# Patient Record
Sex: FEMALE | Race: WHITE | Employment: OTHER | URBAN - METROPOLITAN AREA
[De-identification: names, ages, dates, MRNs, and addresses within clinical notes are randomized per-mention and may not be internally consistent; named-entity substitution may affect disease eponyms.]

---

## 2017-02-06 PROBLEM — Z47.89 ORTHOPEDIC AFTERCARE: Status: ACTIVE | Noted: 2017-02-06

## 2017-04-07 RX ORDER — CEFADROXIL 500 MG/1
CAPSULE ORAL 2 TIMES DAILY
Status: ON HOLD | COMMUNITY
End: 2019-01-01

## 2017-04-18 ENCOUNTER — ANESTHESIA EVENT (OUTPATIENT)
Dept: SURGERY | Facility: HOSPITAL | Age: 82
End: 2017-04-18

## 2017-04-18 ENCOUNTER — SURGERY (OUTPATIENT)
Age: 82
End: 2017-04-18

## 2017-04-18 ENCOUNTER — ANESTHESIA (OUTPATIENT)
Dept: SURGERY | Facility: HOSPITAL | Age: 82
End: 2017-04-18

## 2017-04-18 ENCOUNTER — HOSPITAL ENCOUNTER (OUTPATIENT)
Facility: HOSPITAL | Age: 82
Setting detail: HOSPITAL OUTPATIENT SURGERY
Discharge: HOME | End: 2017-04-18
Attending: ORTHOPAEDIC SURGERY | Admitting: ORTHOPAEDIC SURGERY
Payer: MEDICARE

## 2017-04-18 VITALS
HEART RATE: 68 BPM | TEMPERATURE: 98 F | BODY MASS INDEX: 27 KG/M2 | SYSTOLIC BLOOD PRESSURE: 125 MMHG | OXYGEN SATURATION: 94 % | WEIGHT: 145 LBS | DIASTOLIC BLOOD PRESSURE: 53 MMHG | RESPIRATION RATE: 16 BRPM

## 2017-04-18 PROCEDURE — 0SPF04Z REMOVAL OF INTERNAL FIXATION DEVICE FROM RIGHT ANKLE JOINT, OPEN APPROACH: ICD-10-PCS | Performed by: ORTHOPAEDIC SURGERY

## 2017-04-18 PROCEDURE — 82962 GLUCOSE BLOOD TEST: CPT

## 2017-04-18 RX ORDER — DEXTROSE MONOHYDRATE 25 G/50ML
50 INJECTION, SOLUTION INTRAVENOUS
Status: DISCONTINUED | OUTPATIENT
Start: 2017-04-18 | End: 2017-04-18

## 2017-04-18 RX ORDER — BUPIVACAINE HYDROCHLORIDE 5 MG/ML
INJECTION, SOLUTION EPIDURAL; INTRACAUDAL AS NEEDED
Status: DISCONTINUED | OUTPATIENT
Start: 2017-04-18 | End: 2017-04-18

## 2017-04-18 RX ORDER — ACETAMINOPHEN AND CODEINE PHOSPHATE 300; 30 MG/1; MG/1
1 TABLET ORAL AS NEEDED
Status: DISCONTINUED | OUTPATIENT
Start: 2017-04-18 | End: 2017-04-18

## 2017-04-18 RX ORDER — HYDROMORPHONE HYDROCHLORIDE 1 MG/ML
0.4 INJECTION, SOLUTION INTRAMUSCULAR; INTRAVENOUS; SUBCUTANEOUS EVERY 5 MIN PRN
Status: DISCONTINUED | OUTPATIENT
Start: 2017-04-18 | End: 2017-04-18

## 2017-04-18 RX ORDER — SODIUM CHLORIDE, SODIUM LACTATE, POTASSIUM CHLORIDE, CALCIUM CHLORIDE 600; 310; 30; 20 MG/100ML; MG/100ML; MG/100ML; MG/100ML
INJECTION, SOLUTION INTRAVENOUS CONTINUOUS
Status: DISCONTINUED | OUTPATIENT
Start: 2017-04-18 | End: 2017-04-18

## 2017-04-18 RX ORDER — MIDAZOLAM HYDROCHLORIDE 1 MG/ML
1 INJECTION INTRAMUSCULAR; INTRAVENOUS EVERY 5 MIN PRN
Status: DISCONTINUED | OUTPATIENT
Start: 2017-04-18 | End: 2017-04-18

## 2017-04-18 RX ORDER — ONDANSETRON 2 MG/ML
4 INJECTION INTRAMUSCULAR; INTRAVENOUS AS NEEDED
Status: DISCONTINUED | OUTPATIENT
Start: 2017-04-18 | End: 2017-04-18

## 2017-04-18 RX ORDER — NALOXONE HYDROCHLORIDE 0.4 MG/ML
80 INJECTION, SOLUTION INTRAMUSCULAR; INTRAVENOUS; SUBCUTANEOUS AS NEEDED
Status: DISCONTINUED | OUTPATIENT
Start: 2017-04-18 | End: 2017-04-18

## 2017-04-18 RX ORDER — ACETAMINOPHEN AND CODEINE PHOSPHATE 300; 30 MG/1; MG/1
2 TABLET ORAL AS NEEDED
Status: DISCONTINUED | OUTPATIENT
Start: 2017-04-18 | End: 2017-04-18

## 2017-04-18 NOTE — OR NURSING
Discharge instructions discussed with patient and daughter, verbalized understanding. Post op shoe placed prior to discharge. Per daughter, no need to call report to nursing home, she will give them discharge instructions.  Patient discharged in stable cond

## 2017-04-18 NOTE — BRIEF OP NOTE
Pre-Operative Diagnosis: RIGHT ANKLE RETAINED HARDWARE     Post-Operative Diagnosis: right ankle retained hardware      Procedure Performed:   Procedure(s):  RIGHT ANKLE HARDWARE REMOVAL    Surgeon(s) and Role:     Sangita Brock MD - Primary    As

## 2017-04-18 NOTE — ANESTHESIA PREPROCEDURE EVALUATION
PRE-OP EVALUATION    Patient Name: Sabi Jon    Pre-op Diagnosis: RIGHT ANKLE RETAINED HARDWARE    Procedure(s):  RIGHT ANKLE HARDWARE REMOVAL    Surgeon(s) and Role:     Luis Michelle MD - Primary    Pre-op vitals reviewed.   Temp: 98 °F (36 Artificial Tear Ointment (ARTIFICIAL TEARS) Ophthalmic Ointment Place 1 drop into both ears daily. Disp:  Rfl:    Potassium Chloride ER (K-DUR M20) 20 MEQ Oral Tab CR Take 10 mEq by mouth daily.    Disp:  Rfl:    losartan (COZAAR) 50 MG Oral Tab Take 50 history.          Pulmonary    Pulmonary exam normal.                 Other findings            ASA: 2   Plan: general  NPO status verified and           Plan/risks discussed with: patient                Present on Admission:   **None**

## 2017-04-18 NOTE — H&P
HPI: 80year old female with right ankle retained hardware    Past Medical History   Diagnosis Date   • OSTEOARTHRITIS    • HYPOTHYROIDISM    • HYPERTENSION    • OTHER DISEASES      CATARACTS   • OTHER DISEASES      REFLUX/HEARTBURN   • OTHER DISEASES Disp:  Rfl:    Acidophilus/Pectin Oral Cap Take 1 capsule by mouth daily. Disp:  Rfl:    Vitamin D, Cholecalciferol, 1000 UNITS Oral Cap Take 1,000 Units by mouth daily.  Disp:  Rfl:    HYDROcodone-acetaminophen (NORCO)  MG Oral Tab Take 1 tablet by m S1, S2.  Regular rate and rhythm. No murmurs. Equal pulses   Abdomen: Soft, nontender, nondistended. Positive bowel sounds. No rebound tenderness  Neurologic: No focal neurological deficits. Musculoskeletal: Full range of motion of all extremities.   No

## 2017-04-18 NOTE — ANESTHESIA POSTPROCEDURE EVALUATION
[facility] Patient Status:  Hospital Outpatient Surgery   Age/Gender 80year old female MRN [id number]   [facility] SURGERY Attending Mohini Lux MD   Hosp Day # 0 PCP Jason Atkinson MD       Anesthesia Post-op

## 2017-04-19 NOTE — OPERATIVE REPORT
[facility]    PATIENT'S NAME: Bernarda Tomas   ATTENDING PHYSICIAN: Caesar Menendez M.D. OPERATING PHYSICIAN: Caesar Menendez M.D.    PATIENT ACCOUNT#:   [de-identified]    LOCATION:  [address]  MEDICAL RECORD #:   [id number]

## 2018-10-03 ENCOUNTER — HOSPITAL ENCOUNTER (EMERGENCY)
Facility: HOSPITAL | Age: 83
Discharge: HOME | End: 2018-10-03
Attending: EMERGENCY MEDICINE
Payer: MEDICARE

## 2018-10-03 ENCOUNTER — APPOINTMENT (OUTPATIENT)
Dept: CT IMAGING | Facility: HOSPITAL | Age: 83
End: 2018-10-03
Attending: EMERGENCY MEDICINE
Payer: MEDICARE

## 2018-10-03 VITALS
WEIGHT: 145 LBS | OXYGEN SATURATION: 99 % | HEIGHT: 62 IN | BODY MASS INDEX: 26.68 KG/M2 | RESPIRATION RATE: 16 BRPM | TEMPERATURE: 100 F | SYSTOLIC BLOOD PRESSURE: 129 MMHG | HEART RATE: 73 BPM | DIASTOLIC BLOOD PRESSURE: 59 MMHG

## 2018-10-03 DIAGNOSIS — R46.89 AGGRESSIVE BEHAVIOR: ICD-10-CM

## 2018-10-03 DIAGNOSIS — N39.0 URINARY TRACT INFECTION WITHOUT HEMATURIA, SITE UNSPECIFIED: Primary | ICD-10-CM

## 2018-10-03 PROCEDURE — 96361 HYDRATE IV INFUSION ADD-ON: CPT

## 2018-10-03 PROCEDURE — 70450 CT HEAD/BRAIN W/O DYE: CPT | Performed by: EMERGENCY MEDICINE

## 2018-10-03 PROCEDURE — 85025 COMPLETE CBC W/AUTO DIFF WBC: CPT | Performed by: EMERGENCY MEDICINE

## 2018-10-03 PROCEDURE — 99284 EMERGENCY DEPT VISIT MOD MDM: CPT

## 2018-10-03 PROCEDURE — 87086 URINE CULTURE/COLONY COUNT: CPT | Performed by: EMERGENCY MEDICINE

## 2018-10-03 PROCEDURE — 81001 URINALYSIS AUTO W/SCOPE: CPT | Performed by: EMERGENCY MEDICINE

## 2018-10-03 PROCEDURE — 99285 EMERGENCY DEPT VISIT HI MDM: CPT

## 2018-10-03 PROCEDURE — 87077 CULTURE AEROBIC IDENTIFY: CPT | Performed by: EMERGENCY MEDICINE

## 2018-10-03 PROCEDURE — 80053 COMPREHEN METABOLIC PANEL: CPT | Performed by: EMERGENCY MEDICINE

## 2018-10-03 PROCEDURE — 96360 HYDRATION IV INFUSION INIT: CPT

## 2018-10-03 RX ORDER — LEVOFLOXACIN 750 MG/1
750 TABLET ORAL
Status: DISCONTINUED | OUTPATIENT
Start: 2018-10-03 | End: 2018-10-04

## 2018-10-03 RX ORDER — LEVOFLOXACIN 500 MG/1
500 TABLET, FILM COATED ORAL DAILY
Qty: 7 TABLET | Refills: 0 | Status: SHIPPED | OUTPATIENT
Start: 2018-10-03 | End: 2018-10-10

## 2018-10-03 NOTE — ED INITIAL ASSESSMENT (HPI)
Pt to er from [facility] 23 by [facility] accomp by daughter for c.c. Aggressive behavior at [facility]. Daughter states lately pt having falls and short term memory loss progressively worse. Pt has hx of freq. uti.

## 2018-10-04 NOTE — ED NOTES
Pt was discharged without script for Levoflaxacin 500mg. Faxed to [place] at [facility].

## 2018-10-04 NOTE — BH LEVEL OF CARE ASSESSMENT
Level of Care Assessment Note    General Questions  Why are you here?: Pt is a 80 yr old female who arrived to the ER via EMS from [facility] due to aggressive bx. Pt is oriented to self. Pt states she doesn't know why she's here.  When her daug like sundowners. She's had numerous falls. Today she was going into her room and I tried to stop her, mad yelling, hanging onto the door, another nurse came in and said they would get her stuff for her.  They handed her a book and she reached back and hit m CSSR: Collateral;Patient  In what setting is the screener performed?: in person  1. Have you wished you were dead or wished you could go to sleep and not wake up? (past 30 days): No  2.  Have you actually had any thoughts of killing yourself? (past 30 days) was 80; for years pt has said she wants to die since her  passed away; daughter states used to have more good days when she took her out but she is now in a wheelchair; daughter states she feels is upset about losing independence at the nursing home Use  How often do you have a drink containing alcohol? : Never       Illicit and Prescription Drug Use  Which if any illicit/prescription drugs have you used/abused?: Denies                                                                Support for Recover memory impaired;Recent memory impaired  Orientation Level: Oriented to person;Disoriented to situation;Disoriented to time;Disoriented to place  Insight: Poor  Fair/poor insight as evidenced by: pt has a hx of Dementia and doesn't remember being physically of Depression since her  passed away and, before moving to [facility], she was prescribed Zoloft by her PCP. Daughter states pt has a hx of Anxiety and Dementia. Pt currently sees a N.P. at [facility] and has been residing at [facility] since 2014.

## 2018-10-04 NOTE — ED PROVIDER NOTES
Patient Seen in: [facility] Emergency Department    History   Patient presents with:  Eval-P (psychiatric)    Stated Complaint: eval p   pt from [address] pt agressive behavior with nursing staff.     HPI    Patient is a 63-year-old female comes [place] OR  12/2016: FRACTURE SURGERY      Comment:  right ankle ORIF  1980: HYSTERECTOMY  No date: INGUINAL HERNIA REPAIR  1940 : OTHER SURGICAL HISTORY      Comment:  OVARIAN CYSTECTOMY  No date: OTHER SURGICAL HISTORY      Comment:  CONI BYBASS  No date: OTHE Warm and dry with normal appearance. No rashes or lesions. NEUROLOGICAL:  Motor strength intact all groups.   normal sensation, speech intact    ED Course     Labs Reviewed   URINALYSIS WITH CULTURE REFLEX - Abnormal; Notable for the following components: pt from [address] pt agressive behavior with nursing staff. TECHNIQUE:  Noncontrast CT scanning is performed through the brain. Dose reduction techniques were used.  Dose information is transmitted to the [facility] [address] [facility] Deana Dickey [address] [place] [address] indication for further evaluation, treatment or admission on an emergency basis. Comprehensive verbal and written discharge and follow-up instructions were provided to help prevent relapse or worsening.   Patient was instructed to follow-up with her primar

## 2018-10-04 NOTE — PROGRESS NOTES
[facility] Pharmacy Note:  Renal Adjustment for levofloxacin [facility])    Sonia Brenner is a 80year old female who has been prescribed levofloxacin (LEVAQUIN) 500 mg every 24 hrs. CrCl is estimated creatinine clearance is 22 mL/min (A) (based on SCr of 1.

## 2018-10-06 RX ORDER — AMOXICILLIN 500 MG/1
500 TABLET, FILM COATED ORAL 2 TIMES DAILY
Qty: 20 TABLET | Refills: 0 | Status: SHIPPED | OUTPATIENT
Start: 2018-10-06 | End: 2018-10-16

## 2019-01-01 ENCOUNTER — HOSPITAL ENCOUNTER (OUTPATIENT)
Facility: HOSPITAL | Age: 84
Setting detail: OBSERVATION
LOS: 1 days | Discharge: NURSING, REHAB OR LONG-TERM CARE | End: 2019-01-02
Attending: STUDENT IN AN ORGANIZED HEALTH CARE EDUCATION/TRAINING PROGRAM | Admitting: FAMILY MEDICINE
Payer: MEDICARE

## 2019-01-01 DIAGNOSIS — K92.0 COFFEE GROUND EMESIS: Primary | ICD-10-CM

## 2019-01-01 LAB
ALBUMIN SERPL-MCNC: 2.8 G/DL (ref 3.1–4.5)
ALBUMIN/GLOB SERPL: 0.8 {RATIO} (ref 1–2)
ALP LIVER SERPL-CCNC: 114 U/L (ref 55–142)
ALT SERPL-CCNC: 13 U/L (ref 14–54)
ANION GAP SERPL CALC-SCNC: 2 MMOL/L (ref 0–18)
ANTIBODY SCREEN: NEGATIVE
APTT PPP: 30.2 SECONDS (ref 26.1–34.6)
AST SERPL-CCNC: 14 U/L (ref 15–41)
BASOPHILS # BLD AUTO: 0.03 X10(3) UL (ref 0–0.1)
BASOPHILS NFR BLD AUTO: 0.2 %
BILIRUB SERPL-MCNC: 0.2 MG/DL (ref 0.1–2)
BILIRUB UR QL STRIP.AUTO: NEGATIVE
BUN BLD-MCNC: 30 MG/DL (ref 8–20)
BUN/CREAT SERPL: 24 (ref 10–20)
CALCIUM BLD-MCNC: 8.1 MG/DL (ref 8.3–10.3)
CHLORIDE SERPL-SCNC: 106 MMOL/L (ref 101–111)
CO2 SERPL-SCNC: 35 MMOL/L (ref 22–32)
COLOR UR AUTO: YELLOW
CREAT BLD-MCNC: 1.25 MG/DL (ref 0.55–1.02)
EOSINOPHIL # BLD AUTO: 0.24 X10(3) UL (ref 0–0.3)
EOSINOPHIL NFR BLD AUTO: 1.8 %
ERYTHROCYTE [DISTWIDTH] IN BLOOD BY AUTOMATED COUNT: 15.3 % (ref 11.5–16)
GLOBULIN PLAS-MCNC: 3.3 G/DL (ref 2.8–4.4)
GLUCOSE BLD-MCNC: 102 MG/DL (ref 70–99)
GLUCOSE UR STRIP.AUTO-MCNC: NEGATIVE MG/DL
HCT VFR BLD AUTO: 36.5 % (ref 34–50)
HGB BLD-MCNC: 11.7 G/DL (ref 12–16)
IMMATURE GRANULOCYTE COUNT: 0.04 X10(3) UL (ref 0–1)
IMMATURE GRANULOCYTE RATIO %: 0.3 %
INR BLD: 1.01 (ref 0.9–1.1)
LIPASE: 57 U/L (ref 73–393)
LYMPHOCYTES # BLD AUTO: 1.05 X10(3) UL (ref 0.9–4)
LYMPHOCYTES NFR BLD AUTO: 7.7 %
M PROTEIN MFR SERPL ELPH: 6.1 G/DL (ref 6.4–8.2)
MCH RBC QN AUTO: 30.2 PG (ref 27–33.2)
MCHC RBC AUTO-ENTMCNC: 32.1 G/DL (ref 31–37)
MCV RBC AUTO: 94.3 FL (ref 81–100)
MONOCYTES # BLD AUTO: 0.69 X10(3) UL (ref 0.1–1)
MONOCYTES NFR BLD AUTO: 5 %
NEUTROPHIL ABS PRELIM: 11.63 X10 (3) UL (ref 1.3–6.7)
NEUTROPHILS # BLD AUTO: 11.63 X10(3) UL (ref 1.3–6.7)
NEUTROPHILS NFR BLD AUTO: 85 %
NITRITE UR QL STRIP.AUTO: POSITIVE
OSMOLALITY SERPL CALC.SUM OF ELEC: 302 MOSM/KG (ref 275–295)
PH UR STRIP.AUTO: 5 [PH] (ref 4.5–8)
PLATELET # BLD AUTO: 309 10(3)UL (ref 150–450)
POTASSIUM SERPL-SCNC: 5 MMOL/L (ref 3.6–5.1)
PRO-BETA NATRIURETIC PEPTIDE: 744 PG/ML (ref ?–450)
PROT UR STRIP.AUTO-MCNC: NEGATIVE MG/DL
PSA SERPL DL<=0.01 NG/ML-MCNC: 13.7 SECONDS (ref 12.4–14.7)
RBC # BLD AUTO: 3.87 X10(6)UL (ref 3.8–5.1)
RBC UR QL AUTO: NEGATIVE
RED CELL DISTRIBUTION WIDTH-SD: 52.8 FL (ref 35.1–46.3)
RH BLOOD TYPE: POSITIVE
SODIUM SERPL-SCNC: 143 MMOL/L (ref 136–144)
SP GR UR STRIP.AUTO: 1.02 (ref 1–1.03)
TROPONIN I SERPL-MCNC: <0.046 NG/ML (ref ?–0.05)
UROBILINOGEN UR STRIP.AUTO-MCNC: 2 MG/DL
WBC # BLD AUTO: 13.7 X10(3) UL (ref 4–13)
WBC #/AREA URNS AUTO: >50 /HPF

## 2019-01-01 PROCEDURE — 86901 BLOOD TYPING SEROLOGIC RH(D): CPT | Performed by: STUDENT IN AN ORGANIZED HEALTH CARE EDUCATION/TRAINING PROGRAM

## 2019-01-01 PROCEDURE — C9113 INJ PANTOPRAZOLE SODIUM, VIA: HCPCS | Performed by: FAMILY MEDICINE

## 2019-01-01 PROCEDURE — 83690 ASSAY OF LIPASE: CPT | Performed by: STUDENT IN AN ORGANIZED HEALTH CARE EDUCATION/TRAINING PROGRAM

## 2019-01-01 PROCEDURE — 81001 URINALYSIS AUTO W/SCOPE: CPT | Performed by: FAMILY MEDICINE

## 2019-01-01 PROCEDURE — 84484 ASSAY OF TROPONIN QUANT: CPT | Performed by: STUDENT IN AN ORGANIZED HEALTH CARE EDUCATION/TRAINING PROGRAM

## 2019-01-01 PROCEDURE — 85025 COMPLETE CBC W/AUTO DIFF WBC: CPT | Performed by: STUDENT IN AN ORGANIZED HEALTH CARE EDUCATION/TRAINING PROGRAM

## 2019-01-01 PROCEDURE — 96374 THER/PROPH/DIAG INJ IV PUSH: CPT

## 2019-01-01 PROCEDURE — 86900 BLOOD TYPING SEROLOGIC ABO: CPT | Performed by: STUDENT IN AN ORGANIZED HEALTH CARE EDUCATION/TRAINING PROGRAM

## 2019-01-01 PROCEDURE — 99285 EMERGENCY DEPT VISIT HI MDM: CPT

## 2019-01-01 PROCEDURE — 85730 THROMBOPLASTIN TIME PARTIAL: CPT | Performed by: STUDENT IN AN ORGANIZED HEALTH CARE EDUCATION/TRAINING PROGRAM

## 2019-01-01 PROCEDURE — 96376 TX/PRO/DX INJ SAME DRUG ADON: CPT

## 2019-01-01 PROCEDURE — 96375 TX/PRO/DX INJ NEW DRUG ADDON: CPT

## 2019-01-01 PROCEDURE — 87088 URINE BACTERIA CULTURE: CPT | Performed by: FAMILY MEDICINE

## 2019-01-01 PROCEDURE — 85610 PROTHROMBIN TIME: CPT | Performed by: STUDENT IN AN ORGANIZED HEALTH CARE EDUCATION/TRAINING PROGRAM

## 2019-01-01 PROCEDURE — 87086 URINE CULTURE/COLONY COUNT: CPT | Performed by: FAMILY MEDICINE

## 2019-01-01 PROCEDURE — 93010 ELECTROCARDIOGRAM REPORT: CPT

## 2019-01-01 PROCEDURE — 83880 ASSAY OF NATRIURETIC PEPTIDE: CPT | Performed by: STUDENT IN AN ORGANIZED HEALTH CARE EDUCATION/TRAINING PROGRAM

## 2019-01-01 PROCEDURE — 87186 SC STD MICRODIL/AGAR DIL: CPT | Performed by: FAMILY MEDICINE

## 2019-01-01 PROCEDURE — 86850 RBC ANTIBODY SCREEN: CPT | Performed by: STUDENT IN AN ORGANIZED HEALTH CARE EDUCATION/TRAINING PROGRAM

## 2019-01-01 PROCEDURE — C9113 INJ PANTOPRAZOLE SODIUM, VIA: HCPCS | Performed by: STUDENT IN AN ORGANIZED HEALTH CARE EDUCATION/TRAINING PROGRAM

## 2019-01-01 PROCEDURE — 80053 COMPREHEN METABOLIC PANEL: CPT | Performed by: STUDENT IN AN ORGANIZED HEALTH CARE EDUCATION/TRAINING PROGRAM

## 2019-01-01 PROCEDURE — 96361 HYDRATE IV INFUSION ADD-ON: CPT

## 2019-01-01 PROCEDURE — 93005 ELECTROCARDIOGRAM TRACING: CPT

## 2019-01-01 RX ORDER — BENZONATATE 100 MG/1
100 CAPSULE ORAL 3 TIMES DAILY PRN
Status: ON HOLD | COMMUNITY
End: 2019-01-01

## 2019-01-01 RX ORDER — SODIUM CHLORIDE 9 MG/ML
125 INJECTION, SOLUTION INTRAVENOUS CONTINUOUS
Status: DISCONTINUED | OUTPATIENT
Start: 2019-01-01 | End: 2019-01-02

## 2019-01-01 RX ORDER — CHLORPHENIRAMINE MALEATE 4 MG/1
12 TABLET ORAL EVERY 8 HOURS PRN
Status: ON HOLD | COMMUNITY
End: 2019-01-02

## 2019-01-01 RX ORDER — GUAIFENESIN AND DEXTROMETHORPHAN HYDROBROMIDE 100; 10 MG/5ML; MG/5ML
5 SOLUTION ORAL EVERY 6 HOURS PRN
Status: ON HOLD | COMMUNITY
End: 2019-01-02

## 2019-01-01 RX ORDER — ARIPIPRAZOLE 2 MG/1
2 TABLET ORAL NIGHTLY
Status: DISCONTINUED | OUTPATIENT
Start: 2019-01-01 | End: 2019-01-02

## 2019-01-01 RX ORDER — SODIUM CHLORIDE 9 MG/ML
INJECTION, SOLUTION INTRAVENOUS CONTINUOUS
Status: DISCONTINUED | OUTPATIENT
Start: 2019-01-01 | End: 2019-01-02

## 2019-01-01 RX ORDER — ONDANSETRON 2 MG/ML
INJECTION INTRAMUSCULAR; INTRAVENOUS
Status: DISPENSED
Start: 2019-01-01 | End: 2019-01-01

## 2019-01-01 RX ORDER — DIVALPROEX SODIUM 250 MG/1
250 TABLET, DELAYED RELEASE ORAL NIGHTLY
Status: ON HOLD | COMMUNITY
End: 2019-01-01

## 2019-01-01 RX ORDER — DIVALPROEX SODIUM 125 MG/1
125 CAPSULE, COATED PELLETS ORAL NIGHTLY
Status: ON HOLD | COMMUNITY
End: 2020-01-01

## 2019-01-01 RX ORDER — ARIPIPRAZOLE 2 MG/1
2 TABLET ORAL SEE ADMIN INSTRUCTIONS
Status: ON HOLD | COMMUNITY
End: 2020-01-01

## 2019-01-01 RX ORDER — BUPROPION HYDROCHLORIDE 75 MG/1
75 TABLET ORAL 2 TIMES DAILY
COMMUNITY
End: 2020-01-01

## 2019-01-01 NOTE — PLAN OF CARE
GASTROINTESTINAL - ADULT    • Minimal or absence of nausea and vomiting Progressing    • Maintains or returns to baseline bowel function Progressing    • Maintains adequate nutritional intake (undernourished) Progressing        Pt.  W/o c/o nausea or emesis

## 2019-01-01 NOTE — ED PROVIDER NOTES
Patient Seen in: [facility] Emergency Department    History   Patient presents with:  GI Bleeding (gastrointestinal)    Stated Complaint:     HPI    Patient is a 59-year-old lady resenting from nursing home after an episode of coffee ground emesis. • OTHER SURGICAL HISTORY      L/R CAROTID ENDART. Social History    Tobacco Use      Smoking status: Former Smoker        Years: 25.00      Smokeless tobacco: Never Used    Alcohol use:  Yes      Alcohol/week: 0.5 oz      Types: 1 Glasses of win (*)     GFR, -American 42 (*)     AST 14 (*)     Alt 13 (*)     Total Protein 6.1 (*)     Albumin 2.8 (*)     A/G Ratio 0.8 (*)     All other components within normal limits   LIPASE - Abnormal; Notable for the following components:    Lipase 57 (*) as noted on EKG Report. Rate: 92  Rhythm: Sinus Rhythm  Reading: Sinus rhythm, normal intervals, left axis deviation, right bundle branch block noted, no ST elevations. No significant change from previous EKG.                     MDM     Extensive differe

## 2019-01-01 NOTE — CONSULTS
[facility] Pharmacy Note:  Renal Adjustment for ampicillin     Wilber Collins is a 80year old female who has been prescribed ampicillin 500 mg every 8 hrs 3 doses. CrCl is estimated creatinine clearance is 25.2 mL/min (A) (based on SCr of 1.25 mg/dL (H)).  s

## 2019-01-01 NOTE — PLAN OF CARE
METABOLIC/FLUID AND ELECTROLYTES - ADULT    • Electrolytes maintained within normal limits Progressing    • Hemodynamic stability and optimal renal function maintained Progressing          GASTROINTESTINAL - ADULT    • Minimal or absence of nausea and vomi

## 2019-01-01 NOTE — H&P
[facility]    History and Physical     Marni Estrada Patient Status:  Inpatient    1923 MRN [id number]   [facility] 4NW-A Attending Fantasma Kasper MD   Hosp Day # 0 PCP Tesfaye Durham MD         Subjective:   Marni Estrada 01/01/2019    CREATSERUM 1.25 (H) 01/01/2019    BUN 30 (H) 01/01/2019     01/01/2019    K 5.0 01/01/2019     01/01/2019    CO2 35.0 (H) 01/01/2019     (H) 01/01/2019    CA 8.1 (L) 01/01/2019    ALB 2.8 (L) 01/01/2019    ALKPHO 114 01/01/

## 2019-01-01 NOTE — CONSULTS
Gastroenterology Initial Consultation    Ronnie Vinson Patient Status:  Inpatient    1923 MRN [id number]   [facility] 4NW-A Attending Mary Heard MD   Hosp Day # 0 PCP Reji Plasencia MD       Reason for Consultation/Chief Co CATARACT  1993   • CATARACT  1998   • CHOLECYSTECTOMY     • EXTREMITY LOWER HARDWARE REMOVAL Right 4/18/2017    Performed by Orly Chaidez MD at [facility] MAIN OR   • FRACTURE SURGERY  12/2016    right ankle ORIF   • HYSTERECTOMY  1980   • INGUINAL HERNIA REP auscultation  Cardiovascular: Normal carotid artery pulses bilaterally, no lower extremity edema  Gastrointestinal: Soft, non-tender, non-guarded on palpation. Ventral hernia. FOBT not indicated. Lymphatic: No cervical or supraclavicular nodes palpated.

## 2019-01-01 NOTE — CM/SW NOTE
01/01/19 0900   CM/SW Referral Data   Referral Source Nurse;Family; Social Work (self-referral)   Reason for Referral Discharge planning;Psychoscial assessment   Informant Patient     SW order placed to assist with discharge planning. Chart reviewed.  Pt

## 2019-01-02 VITALS
TEMPERATURE: 98 F | SYSTOLIC BLOOD PRESSURE: 159 MMHG | HEIGHT: 66 IN | HEART RATE: 74 BPM | OXYGEN SATURATION: 96 % | RESPIRATION RATE: 14 BRPM | DIASTOLIC BLOOD PRESSURE: 64 MMHG | BODY MASS INDEX: 23.76 KG/M2 | WEIGHT: 147.81 LBS

## 2019-01-02 LAB
ALBUMIN SERPL-MCNC: 2.2 G/DL (ref 3.1–4.5)
ALBUMIN/GLOB SERPL: 0.8 {RATIO} (ref 1–2)
ALP LIVER SERPL-CCNC: 87 U/L (ref 55–142)
ALT SERPL-CCNC: 8 U/L (ref 14–54)
ANION GAP SERPL CALC-SCNC: 4 MMOL/L (ref 0–18)
AST SERPL-CCNC: 13 U/L (ref 15–41)
ATRIAL RATE: 92 BPM
BILIRUB SERPL-MCNC: 0.3 MG/DL (ref 0.1–2)
BUN BLD-MCNC: 19 MG/DL (ref 8–20)
BUN/CREAT SERPL: 20.9 (ref 10–20)
CALCIUM BLD-MCNC: 7.4 MG/DL (ref 8.3–10.3)
CHLORIDE SERPL-SCNC: 110 MMOL/L (ref 101–111)
CO2 SERPL-SCNC: 31 MMOL/L (ref 22–32)
CREAT BLD-MCNC: 0.91 MG/DL (ref 0.55–1.02)
ERYTHROCYTE [DISTWIDTH] IN BLOOD BY AUTOMATED COUNT: 15.3 % (ref 11.5–16)
GLOBULIN PLAS-MCNC: 2.6 G/DL (ref 2.8–4.4)
GLUCOSE BLD-MCNC: 81 MG/DL (ref 70–99)
HCT VFR BLD AUTO: 30.3 % (ref 34–50)
HGB BLD-MCNC: 9.1 G/DL (ref 12–16)
M PROTEIN MFR SERPL ELPH: 4.8 G/DL (ref 6.4–8.2)
MCH RBC QN AUTO: 29.3 PG (ref 27–33.2)
MCHC RBC AUTO-ENTMCNC: 30 G/DL (ref 31–37)
MCV RBC AUTO: 97.4 FL (ref 81–100)
OSMOLALITY SERPL CALC.SUM OF ELEC: 301 MOSM/KG (ref 275–295)
P AXIS: 40 DEGREES
P-R INTERVAL: 204 MS
PLATELET # BLD AUTO: 256 10(3)UL (ref 150–450)
POTASSIUM SERPL-SCNC: 4.6 MMOL/L (ref 3.6–5.1)
Q-T INTERVAL: 402 MS
QRS DURATION: 142 MS
QTC CALCULATION (BEZET): 497 MS
R AXIS: -37 DEGREES
RBC # BLD AUTO: 3.11 X10(6)UL (ref 3.8–5.1)
RED CELL DISTRIBUTION WIDTH-SD: 54.4 FL (ref 35.1–46.3)
SODIUM SERPL-SCNC: 145 MMOL/L (ref 136–144)
T AXIS: 28 DEGREES
VENTRICULAR RATE: 92 BPM
WBC # BLD AUTO: 6.5 X10(3) UL (ref 4–13)

## 2019-01-02 PROCEDURE — 85027 COMPLETE CBC AUTOMATED: CPT | Performed by: FAMILY MEDICINE

## 2019-01-02 PROCEDURE — C9113 INJ PANTOPRAZOLE SODIUM, VIA: HCPCS | Performed by: FAMILY MEDICINE

## 2019-01-02 PROCEDURE — 96376 TX/PRO/DX INJ SAME DRUG ADON: CPT

## 2019-01-02 PROCEDURE — 80053 COMPREHEN METABOLIC PANEL: CPT | Performed by: FAMILY MEDICINE

## 2019-01-02 RX ORDER — PANTOPRAZOLE SODIUM 20 MG/1
20 TABLET, DELAYED RELEASE ORAL
Status: DISCONTINUED | OUTPATIENT
Start: 2019-01-02 | End: 2019-01-02

## 2019-01-02 RX ORDER — ARIPIPRAZOLE 2 MG/1
1 TABLET ORAL NIGHTLY
Status: DISCONTINUED | OUTPATIENT
Start: 2019-01-02 | End: 2019-01-02

## 2019-01-02 RX ORDER — OMEPRAZOLE 20 MG/1
20 CAPSULE, DELAYED RELEASE ORAL
Qty: 60 CAPSULE | Refills: 0 | Status: SHIPPED | OUTPATIENT
Start: 2019-01-02 | End: 2019-02-01

## 2019-01-02 RX ORDER — SUCRALFATE 1 G/1
1 TABLET ORAL
Qty: 1 TABLET | Refills: 0 | Status: SHIPPED | OUTPATIENT
Start: 2019-01-02 | End: 2019-01-30

## 2019-01-02 RX ORDER — ARIPIPRAZOLE 2 MG/1
1 TABLET ORAL NIGHTLY
OUTPATIENT
Start: 2019-01-02

## 2019-01-02 NOTE — DISCHARGE SUMMARY
[facility]    Discharge Summary    Feliciano Weaver Patient Status:  Inpatient    1923 MRN [id number]   [facility] 4NW-A Attending Dominick Santos MD   [facility] Day # 1 PCP Maru Mathias MD     Date of Admission: 2019 Dispositi Take 12 mg by mouth every 8 (eight) hours as needed for Allergies. Extended release 12 mg   Refills:  0     cyanocobalamin 1000 MCG/ML Soln  Commonly known as:  VITAMIN B12      Inject 1 mL into the muscle every 30 (thirty) days.    Quantity:  1 mL  Refi (Cholecalciferol) 1000 units Caps      Take 1,000 Units by mouth daily. Refills:  0            Follow up Visits: Follow-up with me at nursing home    Follow up Labs:  Will be done at nursing home    Other Discharge Instructions: JOAQUIN Durham  1/2/2

## 2019-01-02 NOTE — PHYSICAL THERAPY NOTE
PT order received, Chart reviewed. Per OT, pt uses lift to get to W/C at [facility] and has assist for all ADLs at baseline. Will dc as this time as pt is at functional baseline. Rec nsg staff continue use of lift to mobilize OOB when appropriate.

## 2019-01-02 NOTE — PROGRESS NOTES
Pt alert to self. Pt confused and asking to help her get up, however she does not get up. Pt incontinent and at times will ask to go to the bathroom but she did not go in the bed pan either time only in the pad. Pt given ABT per mar.  Pt slept through the n

## 2019-01-02 NOTE — CM/SW NOTE
Cleared for Discharge to [facility] via [facility] ambulance today at 4 pm. Updates sent to [facility] Pat's~ family and patient informed of discharge. Please call report to #[phone].     Ambulance pick-up changed to 6:30 pm.  Alfredo Cordova, 01/02/19,

## 2019-01-02 NOTE — PROGRESS NOTES
Alert,confused disoriented to place and person; rounded this am and told this nurse will call daughter and plan to disch today;called daughter Madelaine Carrion and confirmed MD called her;aware of disch plan;will bautista her to tell time of disch;   Denies any

## 2019-01-02 NOTE — CM/SW NOTE
COND 44: Patient failed Inpatient criteria. Second level of review completed and supports Observation. UR committee in agreement. Discussed with Dr Mathew Rios approves.

## 2019-01-02 NOTE — OCCUPATIONAL THERAPY NOTE
OT order received, Chart reviewed, paper chart from [address] [place] [address]. Pat's reviewed, pt uses lift to get to W/C at [facility] and has assist for all ADLs at baseline, pt not approp for skilled IP OT as pt has assist a baseline for all ADLs and is non-ambulatory.   Will D/C OT

## 2019-01-03 NOTE — PROGRESS NOTES
NURSING DISCHARGE NOTE    Discharged Rehab facility via Ambulance to [facility]. Accompanied by Support staff  Belongings Taken by patient/family.

## 2020-01-01 ENCOUNTER — HOSPITAL ENCOUNTER (INPATIENT)
Facility: HOSPITAL | Age: 85
LOS: 1 days | DRG: 871 | End: 2020-01-01
Attending: INTERNAL MEDICINE | Admitting: INTERNAL MEDICINE
Payer: OTHER MISCELLANEOUS

## 2020-01-01 ENCOUNTER — APPOINTMENT (OUTPATIENT)
Dept: CT IMAGING | Facility: HOSPITAL | Age: 85
DRG: 871 | End: 2020-01-01
Attending: EMERGENCY MEDICINE
Payer: MEDICARE

## 2020-01-01 ENCOUNTER — APPOINTMENT (OUTPATIENT)
Dept: GENERAL RADIOLOGY | Facility: HOSPITAL | Age: 85
DRG: 871 | End: 2020-01-01
Attending: EMERGENCY MEDICINE
Payer: MEDICARE

## 2020-01-01 ENCOUNTER — APPOINTMENT (OUTPATIENT)
Dept: GENERAL RADIOLOGY | Facility: HOSPITAL | Age: 85
DRG: 871 | End: 2020-01-01
Attending: INTERNAL MEDICINE
Payer: MEDICARE

## 2020-01-01 ENCOUNTER — HOSPITAL ENCOUNTER (INPATIENT)
Facility: HOSPITAL | Age: 85
LOS: 2 days | Discharge: HOSPICE | DRG: 871 | End: 2020-01-01
Attending: EMERGENCY MEDICINE | Admitting: INTERNAL MEDICINE
Payer: MEDICARE

## 2020-01-01 VITALS
DIASTOLIC BLOOD PRESSURE: 48 MMHG | OXYGEN SATURATION: 85 % | TEMPERATURE: 99 F | RESPIRATION RATE: 20 BRPM | HEART RATE: 99 BPM | BODY MASS INDEX: 24 KG/M2 | SYSTOLIC BLOOD PRESSURE: 105 MMHG | WEIGHT: 147.69 LBS

## 2020-01-01 DIAGNOSIS — I48.91 ATRIAL FIBRILLATION WITH RAPID VENTRICULAR RESPONSE (HCC): ICD-10-CM

## 2020-01-01 DIAGNOSIS — J18.9 SEPSIS DUE TO PNEUMONIA (HCC): Primary | ICD-10-CM

## 2020-01-01 DIAGNOSIS — A41.9 SEPSIS DUE TO PNEUMONIA (HCC): Primary | ICD-10-CM

## 2020-01-01 LAB
ADENOVIRUS PCR:: NEGATIVE
ALBUMIN SERPL-MCNC: 2 G/DL (ref 3.4–5)
ALBUMIN SERPL-MCNC: 2.6 G/DL (ref 3.4–5)
ALBUMIN/GLOB SERPL: 0.5 {RATIO} (ref 1–2)
ALBUMIN/GLOB SERPL: 0.6 {RATIO} (ref 1–2)
ALP LIVER SERPL-CCNC: 131 U/L (ref 55–142)
ALP LIVER SERPL-CCNC: 230 U/L (ref 55–142)
ALT SERPL-CCNC: 61 U/L (ref 13–56)
ALT SERPL-CCNC: 97 U/L (ref 13–56)
ANION GAP SERPL CALC-SCNC: 7 MMOL/L (ref 0–18)
ANION GAP SERPL CALC-SCNC: 8 MMOL/L (ref 0–18)
ANION GAP SERPL CALC-SCNC: 9 MMOL/L (ref 0–18)
AST SERPL-CCNC: 125 U/L (ref 15–37)
AST SERPL-CCNC: 76 U/L (ref 15–37)
ATRIAL RATE: 108 BPM
B PERT DNA SPEC QL NAA+PROBE: NEGATIVE
BASOPHILS # BLD AUTO: 0.05 X10(3) UL (ref 0–0.2)
BASOPHILS # BLD: 0.09 X10(3) UL (ref 0–0.2)
BASOPHILS NFR BLD AUTO: 0.5 %
BASOPHILS NFR BLD: 1 %
BILIRUB SERPL-MCNC: 0.6 MG/DL (ref 0.1–2)
BILIRUB SERPL-MCNC: 0.9 MG/DL (ref 0.1–2)
BUN BLD-MCNC: 71 MG/DL (ref 7–18)
BUN BLD-MCNC: 74 MG/DL (ref 7–18)
BUN BLD-MCNC: 90 MG/DL (ref 7–18)
BUN/CREAT SERPL: 40.8 (ref 10–20)
BUN/CREAT SERPL: 48.1 (ref 10–20)
BUN/CREAT SERPL: 48.1 (ref 10–20)
C PNEUM DNA SPEC QL NAA+PROBE: NEGATIVE
CALCIUM BLD-MCNC: 8.1 MG/DL (ref 8.5–10.1)
CALCIUM BLD-MCNC: 8.2 MG/DL (ref 8.5–10.1)
CALCIUM BLD-MCNC: 8.6 MG/DL (ref 8.5–10.1)
CHLORIDE SERPL-SCNC: 108 MMOL/L (ref 98–112)
CHLORIDE SERPL-SCNC: 115 MMOL/L (ref 98–112)
CHLORIDE SERPL-SCNC: 115 MMOL/L (ref 98–112)
CO2 SERPL-SCNC: 24 MMOL/L (ref 21–32)
CO2 SERPL-SCNC: 26 MMOL/L (ref 21–32)
CO2 SERPL-SCNC: 27 MMOL/L (ref 21–32)
CORONAVIRUS 229E PCR:: NEGATIVE
CORONAVIRUS HKU1 PCR:: NEGATIVE
CORONAVIRUS NL63 PCR:: NEGATIVE
CORONAVIRUS OC43 PCR:: NEGATIVE
CREAT BLD-MCNC: 1.54 MG/DL (ref 0.55–1.02)
CREAT BLD-MCNC: 1.74 MG/DL (ref 0.55–1.02)
CREAT BLD-MCNC: 1.87 MG/DL (ref 0.55–1.02)
DEPRECATED RDW RBC AUTO: 49.2 FL (ref 35.1–46.3)
DEPRECATED RDW RBC AUTO: 52.6 FL (ref 35.1–46.3)
EOSINOPHIL # BLD AUTO: 0 X10(3) UL (ref 0–0.7)
EOSINOPHIL # BLD: 0 X10(3) UL (ref 0–0.7)
EOSINOPHIL NFR BLD AUTO: 0 %
EOSINOPHIL NFR BLD: 0 %
ERYTHROCYTE [DISTWIDTH] IN BLOOD BY AUTOMATED COUNT: 14.2 % (ref 11–15)
ERYTHROCYTE [DISTWIDTH] IN BLOOD BY AUTOMATED COUNT: 14.4 % (ref 11–15)
FLUAV RNA SPEC QL NAA+PROBE: NEGATIVE
FLUBV RNA SPEC QL NAA+PROBE: NEGATIVE
GLOBULIN PLAS-MCNC: 4 G/DL (ref 2.8–4.4)
GLOBULIN PLAS-MCNC: 4.7 G/DL (ref 2.8–4.4)
GLUCOSE BLD-MCNC: 118 MG/DL (ref 70–99)
GLUCOSE BLD-MCNC: 133 MG/DL (ref 70–99)
GLUCOSE BLD-MCNC: 151 MG/DL (ref 70–99)
HCT VFR BLD AUTO: 29.7 % (ref 35–48)
HCT VFR BLD AUTO: 33.4 % (ref 35–48)
HGB BLD-MCNC: 10.7 G/DL (ref 12–16)
HGB BLD-MCNC: 9 G/DL (ref 12–16)
IMM GRANULOCYTES # BLD AUTO: 0.04 X10(3) UL (ref 0–1)
IMM GRANULOCYTES NFR BLD: 0.4 %
LACTATE SERPL-SCNC: 1.3 MMOL/L (ref 0.4–2)
LACTATE SERPL-SCNC: 2.3 MMOL/L (ref 0.4–2)
LYMPHOCYTES # BLD AUTO: 0.7 X10(3) UL (ref 1–4)
LYMPHOCYTES NFR BLD AUTO: 7 %
LYMPHOCYTES NFR BLD: 0.83 X10(3) UL (ref 1–4)
LYMPHOCYTES NFR BLD: 9 %
M PROTEIN MFR SERPL ELPH: 6 G/DL (ref 6.4–8.2)
M PROTEIN MFR SERPL ELPH: 7.3 G/DL (ref 6.4–8.2)
MCH RBC QN AUTO: 30.2 PG (ref 26–34)
MCH RBC QN AUTO: 30.3 PG (ref 26–34)
MCHC RBC AUTO-ENTMCNC: 30.3 G/DL (ref 31–37)
MCHC RBC AUTO-ENTMCNC: 32 G/DL (ref 31–37)
MCV RBC AUTO: 100 FL (ref 80–100)
MCV RBC AUTO: 94.4 FL (ref 80–100)
METAPNEUMOVIRUS PCR:: NEGATIVE
MONOCYTES # BLD AUTO: 1.09 X10(3) UL (ref 0.1–1)
MONOCYTES # BLD: 0.55 X10(3) UL (ref 0.1–1)
MONOCYTES NFR BLD AUTO: 10.9 %
MONOCYTES NFR BLD: 6 %
MORPHOLOGY: NORMAL
MYCOPLASMA PNEUMONIA PCR:: NEGATIVE
NEUTROPHILS # BLD AUTO: 7.56 X10 (3) UL (ref 1.5–7.7)
NEUTROPHILS # BLD AUTO: 8.09 X10 (3) UL (ref 1.5–7.7)
NEUTROPHILS # BLD AUTO: 8.09 X10(3) UL (ref 1.5–7.7)
NEUTROPHILS NFR BLD AUTO: 81.2 %
NEUTROPHILS NFR BLD: 64 %
NEUTS BAND NFR BLD: 20 %
NEUTS HYPERSEG # BLD: 7.73 X10(3) UL (ref 1.5–7.7)
NT-PROBNP SERPL-MCNC: ABNORMAL PG/ML (ref ?–450)
OSMOLALITY SERPL CALC.SUM OF ELEC: 320 MOSM/KG (ref 275–295)
OSMOLALITY SERPL CALC.SUM OF ELEC: 329 MOSM/KG (ref 275–295)
OSMOLALITY SERPL CALC.SUM OF ELEC: 336 MOSM/KG (ref 275–295)
PARAINFLUENZA 1 PCR:: NEGATIVE
PARAINFLUENZA 2 PCR:: NEGATIVE
PARAINFLUENZA 3 PCR:: NEGATIVE
PARAINFLUENZA 4 PCR:: NEGATIVE
PLATELET # BLD AUTO: 207 10(3)UL (ref 150–450)
PLATELET # BLD AUTO: 263 10(3)UL (ref 150–450)
PLATELET MORPHOLOGY: NORMAL
POTASSIUM SERPL-SCNC: 4.4 MMOL/L (ref 3.5–5.1)
POTASSIUM SERPL-SCNC: 4.5 MMOL/L (ref 3.5–5.1)
POTASSIUM SERPL-SCNC: 4.7 MMOL/L (ref 3.5–5.1)
Q-T INTERVAL: 352 MS
QRS DURATION: 146 MS
QTC CALCULATION (BEZET): 518 MS
R AXIS: -8 DEGREES
RBC # BLD AUTO: 2.97 X10(6)UL (ref 3.8–5.3)
RBC # BLD AUTO: 3.54 X10(6)UL (ref 3.8–5.3)
RHINOVIRUS/ENTERO PCR:: NEGATIVE
RSV RNA SPEC QL NAA+PROBE: NEGATIVE
SODIUM SERPL-SCNC: 143 MMOL/L (ref 136–145)
SODIUM SERPL-SCNC: 148 MMOL/L (ref 136–145)
SODIUM SERPL-SCNC: 148 MMOL/L (ref 136–145)
T AXIS: -23 DEGREES
TOTAL CELLS COUNTED: 100
VENTRICULAR RATE: 130 BPM
WBC # BLD AUTO: 10 X10(3) UL (ref 4–11)
WBC # BLD AUTO: 9.2 X10(3) UL (ref 4–11)

## 2020-01-01 PROCEDURE — 87581 M.PNEUMON DNA AMP PROBE: CPT | Performed by: EMERGENCY MEDICINE

## 2020-01-01 PROCEDURE — 71045 X-RAY EXAM CHEST 1 VIEW: CPT | Performed by: INTERNAL MEDICINE

## 2020-01-01 PROCEDURE — 85007 BL SMEAR W/DIFF WBC COUNT: CPT | Performed by: INTERNAL MEDICINE

## 2020-01-01 PROCEDURE — 80053 COMPREHEN METABOLIC PANEL: CPT | Performed by: INTERNAL MEDICINE

## 2020-01-01 PROCEDURE — 96368 THER/DIAG CONCURRENT INF: CPT

## 2020-01-01 PROCEDURE — 96376 TX/PRO/DX INJ SAME DRUG ADON: CPT

## 2020-01-01 PROCEDURE — 12011 RPR F/E/E/N/L/M 2.5 CM/<: CPT

## 2020-01-01 PROCEDURE — 70450 CT HEAD/BRAIN W/O DYE: CPT | Performed by: EMERGENCY MEDICINE

## 2020-01-01 PROCEDURE — 83605 ASSAY OF LACTIC ACID: CPT | Performed by: EMERGENCY MEDICINE

## 2020-01-01 PROCEDURE — 96365 THER/PROPH/DIAG IV INF INIT: CPT

## 2020-01-01 PROCEDURE — 71045 X-RAY EXAM CHEST 1 VIEW: CPT | Performed by: EMERGENCY MEDICINE

## 2020-01-01 PROCEDURE — 87486 CHLMYD PNEUM DNA AMP PROBE: CPT | Performed by: EMERGENCY MEDICINE

## 2020-01-01 PROCEDURE — 94640 AIRWAY INHALATION TREATMENT: CPT

## 2020-01-01 PROCEDURE — 96361 HYDRATE IV INFUSION ADD-ON: CPT

## 2020-01-01 PROCEDURE — 99285 EMERGENCY DEPT VISIT HI MDM: CPT

## 2020-01-01 PROCEDURE — 87633 RESP VIRUS 12-25 TARGETS: CPT | Performed by: EMERGENCY MEDICINE

## 2020-01-01 PROCEDURE — 85025 COMPLETE CBC W/AUTO DIFF WBC: CPT | Performed by: INTERNAL MEDICINE

## 2020-01-01 PROCEDURE — 85027 COMPLETE CBC AUTOMATED: CPT | Performed by: INTERNAL MEDICINE

## 2020-01-01 PROCEDURE — 80048 BASIC METABOLIC PNL TOTAL CA: CPT | Performed by: NURSE PRACTITIONER

## 2020-01-01 PROCEDURE — 93005 ELECTROCARDIOGRAM TRACING: CPT

## 2020-01-01 PROCEDURE — 87798 DETECT AGENT NOS DNA AMP: CPT | Performed by: EMERGENCY MEDICINE

## 2020-01-01 PROCEDURE — 93010 ELECTROCARDIOGRAM REPORT: CPT

## 2020-01-01 PROCEDURE — 83880 ASSAY OF NATRIURETIC PEPTIDE: CPT | Performed by: INTERNAL MEDICINE

## 2020-01-01 PROCEDURE — 87040 BLOOD CULTURE FOR BACTERIA: CPT | Performed by: EMERGENCY MEDICINE

## 2020-01-01 PROCEDURE — 87999 UNLISTED MICROBIOLOGY PX: CPT

## 2020-01-01 PROCEDURE — 72125 CT NECK SPINE W/O DYE: CPT | Performed by: EMERGENCY MEDICINE

## 2020-01-01 PROCEDURE — 85025 COMPLETE CBC W/AUTO DIFF WBC: CPT | Performed by: EMERGENCY MEDICINE

## 2020-01-01 PROCEDURE — 80053 COMPREHEN METABOLIC PANEL: CPT | Performed by: EMERGENCY MEDICINE

## 2020-01-01 PROCEDURE — 96366 THER/PROPH/DIAG IV INF ADDON: CPT

## 2020-01-01 PROCEDURE — 0HQ1XZZ REPAIR FACE SKIN, EXTERNAL APPROACH: ICD-10-PCS | Performed by: EMERGENCY MEDICINE

## 2020-01-01 RX ORDER — IPRATROPIUM BROMIDE AND ALBUTEROL SULFATE 2.5; .5 MG/3ML; MG/3ML
3 SOLUTION RESPIRATORY (INHALATION) EVERY 6 HOURS PRN
Status: DISCONTINUED | OUTPATIENT
Start: 2020-01-01 | End: 2020-01-01

## 2020-01-01 RX ORDER — BISACODYL 10 MG
10 SUPPOSITORY, RECTAL RECTAL
Status: DISCONTINUED | OUTPATIENT
Start: 2020-01-01 | End: 2020-01-01

## 2020-01-01 RX ORDER — HALOPERIDOL 5 MG/ML
2 INJECTION INTRAMUSCULAR
Status: DISCONTINUED | OUTPATIENT
Start: 2020-01-01 | End: 2020-01-01

## 2020-01-01 RX ORDER — LORAZEPAM 2 MG/ML
1 INJECTION INTRAMUSCULAR EVERY 4 HOURS PRN
Status: DISCONTINUED | OUTPATIENT
Start: 2020-01-01 | End: 2020-01-01

## 2020-01-01 RX ORDER — MORPHINE SULFATE 4 MG/ML
0.5 INJECTION, SOLUTION INTRAMUSCULAR; INTRAVENOUS EVERY 2 HOUR PRN
Status: DISCONTINUED | OUTPATIENT
Start: 2020-01-01 | End: 2020-01-01

## 2020-01-01 RX ORDER — GLYCOPYRROLATE 0.2 MG/ML
0.4 INJECTION, SOLUTION INTRAMUSCULAR; INTRAVENOUS
Status: DISCONTINUED | OUTPATIENT
Start: 2020-01-01 | End: 2020-01-01

## 2020-01-01 RX ORDER — ONDANSETRON 2 MG/ML
4 INJECTION INTRAMUSCULAR; INTRAVENOUS EVERY 6 HOURS PRN
Status: DISCONTINUED | OUTPATIENT
Start: 2020-01-01 | End: 2020-01-01

## 2020-01-01 RX ORDER — DIVALPROEX SODIUM 125 MG/1
125 CAPSULE, COATED PELLETS ORAL 2 TIMES DAILY
Status: DISCONTINUED | OUTPATIENT
Start: 2020-01-01 | End: 2020-01-01

## 2020-01-01 RX ORDER — ACETAMINOPHEN 325 MG/1
650 TABLET ORAL EVERY 8 HOURS PRN
Status: ON HOLD | COMMUNITY
End: 2020-01-01

## 2020-01-01 RX ORDER — ARIPIPRAZOLE 2 MG/1
2 TABLET ORAL NIGHTLY
Status: DISCONTINUED | OUTPATIENT
Start: 2020-01-01 | End: 2020-01-01

## 2020-01-01 RX ORDER — BUPROPION HYDROCHLORIDE 75 MG/1
75 TABLET ORAL 2 TIMES DAILY
Status: DISCONTINUED | OUTPATIENT
Start: 2020-01-01 | End: 2020-01-01

## 2020-01-01 RX ORDER — ACETAMINOPHEN 650 MG/1
650 SUPPOSITORY RECTAL EVERY 4 HOURS PRN
Status: DISCONTINUED | OUTPATIENT
Start: 2020-01-01 | End: 2020-01-01

## 2020-01-01 RX ORDER — ATROPINE SULFATE 10 MG/ML
2 SOLUTION/ DROPS OPHTHALMIC EVERY 2 HOUR PRN
Status: DISCONTINUED | OUTPATIENT
Start: 2020-01-01 | End: 2020-01-01

## 2020-01-01 RX ORDER — LORAZEPAM 2 MG/ML
0.5 INJECTION INTRAMUSCULAR EVERY 4 HOURS PRN
Status: DISCONTINUED | OUTPATIENT
Start: 2020-01-01 | End: 2020-01-01

## 2020-01-01 RX ORDER — OSELTAMIVIR PHOSPHATE 75 MG/1
75 CAPSULE ORAL DAILY
Status: ON HOLD | COMMUNITY
Start: 2020-01-01 | End: 2020-01-01

## 2020-01-01 RX ORDER — ACETAMINOPHEN 160 MG/5ML
650 SOLUTION ORAL EVERY 4 HOURS PRN
Status: DISCONTINUED | OUTPATIENT
Start: 2020-01-01 | End: 2020-01-01

## 2020-01-01 RX ORDER — DILTIAZEM HYDROCHLORIDE 5 MG/ML
5 INJECTION INTRAVENOUS EVERY 2 HOUR PRN
Status: DISCONTINUED | OUTPATIENT
Start: 2020-01-01 | End: 2020-01-01

## 2020-01-01 RX ORDER — LEVOTHYROXINE SODIUM 0.12 MG/1
125 TABLET ORAL
Status: DISCONTINUED | OUTPATIENT
Start: 2020-01-01 | End: 2020-01-01

## 2020-01-01 RX ORDER — ONDANSETRON 2 MG/ML
4 INJECTION INTRAMUSCULAR; INTRAVENOUS EVERY 4 HOURS PRN
Status: DISCONTINUED | OUTPATIENT
Start: 2020-01-01 | End: 2020-01-01

## 2020-01-01 RX ORDER — SODIUM CHLORIDE 9 MG/ML
INJECTION, SOLUTION INTRAVENOUS CONTINUOUS
Status: ACTIVE | OUTPATIENT
Start: 2020-01-01 | End: 2020-01-01

## 2020-01-01 RX ORDER — LOSARTAN POTASSIUM 50 MG/1
50 TABLET ORAL DAILY
Status: DISCONTINUED | OUTPATIENT
Start: 2020-01-01 | End: 2020-01-01

## 2020-01-01 RX ORDER — DIGOXIN 0.25 MG/ML
125 INJECTION INTRAMUSCULAR; INTRAVENOUS ONCE
Status: COMPLETED | OUTPATIENT
Start: 2020-01-01 | End: 2020-01-01

## 2020-01-01 RX ORDER — HALOPERIDOL 5 MG/ML
1 INJECTION INTRAMUSCULAR
Status: DISCONTINUED | OUTPATIENT
Start: 2020-01-01 | End: 2020-01-01

## 2020-01-01 RX ORDER — SODIUM CHLORIDE 450 MG/100ML
INJECTION, SOLUTION INTRAVENOUS CONTINUOUS
Status: DISCONTINUED | OUTPATIENT
Start: 2020-01-01 | End: 2020-01-01

## 2020-01-01 RX ORDER — SCOLOPAMINE TRANSDERMAL SYSTEM 1 MG/1
1 PATCH, EXTENDED RELEASE TRANSDERMAL
Status: DISCONTINUED | OUTPATIENT
Start: 2020-01-01 | End: 2020-01-01

## 2020-01-01 RX ORDER — ALPRAZOLAM 0.25 MG/1
0.12 TABLET ORAL DAILY PRN
Status: ON HOLD | COMMUNITY
End: 2020-01-01

## 2020-01-01 RX ORDER — ONDANSETRON 4 MG/1
4 TABLET, ORALLY DISINTEGRATING ORAL EVERY 6 HOURS PRN
Status: DISCONTINUED | OUTPATIENT
Start: 2020-01-01 | End: 2020-01-01

## 2020-01-01 RX ORDER — LORAZEPAM 2 MG/ML
2 INJECTION INTRAMUSCULAR EVERY 4 HOURS PRN
Status: DISCONTINUED | OUTPATIENT
Start: 2020-01-01 | End: 2020-01-01

## 2020-01-01 RX ORDER — DILTIAZEM HYDROCHLORIDE 5 MG/ML
20 INJECTION INTRAVENOUS ONCE
Status: COMPLETED | OUTPATIENT
Start: 2020-01-01 | End: 2020-01-01

## 2020-01-01 RX ORDER — MORPHINE SULFATE 4 MG/ML
1 INJECTION, SOLUTION INTRAMUSCULAR; INTRAVENOUS
Status: DISCONTINUED | OUTPATIENT
Start: 2020-01-01 | End: 2020-01-01

## 2020-01-01 RX ORDER — IPRATROPIUM BROMIDE AND ALBUTEROL SULFATE 2.5; .5 MG/3ML; MG/3ML
3 SOLUTION RESPIRATORY (INHALATION) EVERY 6 HOURS PRN
Status: ON HOLD | COMMUNITY
End: 2020-01-01

## 2020-01-01 RX ORDER — MULTIVIT-MIN/IRON/FOLIC ACID/K 18-600-40
2000 CAPSULE ORAL DAILY
Status: ON HOLD | COMMUNITY
End: 2020-01-01

## 2020-01-01 RX ORDER — DILTIAZEM HYDROCHLORIDE 120 MG/1
120 CAPSULE, EXTENDED RELEASE ORAL DAILY
Status: DISCONTINUED | OUTPATIENT
Start: 2020-01-01 | End: 2020-01-01

## 2020-01-01 RX ORDER — HEPARIN SODIUM 5000 [USP'U]/ML
5000 INJECTION, SOLUTION INTRAVENOUS; SUBCUTANEOUS EVERY 12 HOURS SCHEDULED
Status: DISCONTINUED | OUTPATIENT
Start: 2020-01-01 | End: 2020-01-01

## 2020-01-01 RX ORDER — ALBUTEROL SULFATE 2.5 MG/3ML
1.25 SOLUTION RESPIRATORY (INHALATION)
Status: DISCONTINUED | OUTPATIENT
Start: 2020-01-01 | End: 2020-01-01

## 2020-01-01 RX ORDER — DIVALPROEX SODIUM 125 MG/1
125 CAPSULE, COATED PELLETS ORAL 2 TIMES DAILY
Status: ON HOLD | COMMUNITY
End: 2020-01-01

## 2020-01-01 RX ORDER — CALCIUM CARBONATE 200(500)MG
1 TABLET,CHEWABLE ORAL EVERY 6 HOURS PRN
Status: ON HOLD | COMMUNITY
End: 2020-01-01

## 2020-02-15 PROBLEM — G93.49 ENCEPHALOPATHY DUE TO INFECTION: Status: ACTIVE | Noted: 2020-01-01

## 2020-02-15 PROBLEM — D64.9 ANEMIA: Status: ACTIVE | Noted: 2020-01-01

## 2020-02-15 PROBLEM — I48.91 ATRIAL FIBRILLATION WITH RAPID VENTRICULAR RESPONSE (HCC): Status: ACTIVE | Noted: 2020-01-01

## 2020-02-15 PROBLEM — B99.9 ENCEPHALOPATHY DUE TO INFECTION: Status: ACTIVE | Noted: 2020-01-01

## 2020-02-15 PROBLEM — R79.89 AZOTEMIA: Status: ACTIVE | Noted: 2020-01-01

## 2020-02-15 PROBLEM — R73.9 HYPERGLYCEMIA: Status: ACTIVE | Noted: 2020-01-01

## 2020-02-15 PROBLEM — A41.9 SEPSIS DUE TO PNEUMONIA (HCC): Status: ACTIVE | Noted: 2020-01-01

## 2020-02-15 PROBLEM — J18.9 SEPSIS DUE TO PNEUMONIA (HCC): Status: ACTIVE | Noted: 2020-01-01

## 2020-02-15 PROCEDURE — 99221 1ST HOSP IP/OBS SF/LOW 40: CPT | Performed by: INTERNAL MEDICINE

## 2020-02-15 NOTE — ED NOTES
Pt desating to upper 80's on 3l nc, pt repositioned which has brought sats wnl throughout stay, pt sounds very wet, rt called for suctoning, nrb apllied, sats immed up to mid to upper 90's, vss, iv bolus infusing, daughter at bedside supportive

## 2020-02-15 NOTE — SEPSIS REASSESSMENT
[facility]    Sepsis Reassessment Note    /52   Pulse 94   Resp 22   Wt 67 kg   SpO2 100%   BMI 23.84 kg/m²      2:33 PM    Cardiac:  Regularity: Irregular  Rate: Tachycardic  Heart Sounds: S1,S2    Lungs:   Right: Rhonchi  Left: Rhonchi    Pe

## 2020-02-15 NOTE — ED INITIAL ASSESSMENT (HPI)
Pt presents from nh s/p unwitnessed fall out of bed hitting l forehead on corner of nightstand, pt is not on blood thinners, normally a/o x1

## 2020-02-15 NOTE — CONSULTS
[facility]  Report of Consultation    Burrows Velma Patient Status:  Inpatient    1923 MRN [id number]   [facility] 8NE-A Attending Jeannie Rodney MD   Hosp Day # 0 PCP Venancio Gonsalves MD     Reason for Consultation:  Sheree Aragon generalized or localized, unspecified site    • Other and unspecified hyperlipidemia    • OTHER DISEASES     CATARACTS   • OTHER DISEASES     REFLUX/HEARTBURN   • OTHER DISEASES     FREQUENT UTI's   • OTHER DISEASES     INCONTINENCE   • OTHER DISEASES MG Oral Tab, Take 650 mg by mouth every 8 (eight) hours as needed for Pain., Disp: , Rfl: , Past Week at Unknown time  Cholecalciferol (VITAMIN D) 50 MCG (2000 UT) Oral Cap, Take 2,000 Units by mouth daily. , Disp: , Rfl: , 2/15/2020 at Unknown time  MARIZA been on nocturnal O2 but that have been stopped    Vital signs in last 24 hours:  Patient Vitals for the past 24 hrs:   BP Temp Temp src Pulse Resp SpO2 Weight   02/15/20 1653 — — — — — 99 % —   02/15/20 1650 112/61 97.7 °F (36.5 °C) Axillary 120 24 99 % — BUN 71 02/15/2020     02/15/2020    K 4.7 02/15/2020     02/15/2020    CO2 27.0 02/15/2020     02/15/2020    CA 8.6 02/15/2020    ALB 2.6 02/15/2020    ALKPHO 131 02/15/2020    BILT 0.9 02/15/2020    TP 7.3 02/15/2020    AST 76 02/15/202 right ankle retained hardare sx  golbal exp 7/17/17     Coffee ground emesis     Anemia     Azotemia     Encephalopathy, INFECTION     Hyperglycemia     Sepsis due to pneumonia [facility])     Atrial fibrillation with rapid ventricular response ([facility] [address]      Assmadelaine

## 2020-02-15 NOTE — ED PROVIDER NOTES
Patient Seen in: [facility] Emergency Department      History   Patient presents with:  Trauma    Stated Complaint: fall    HPI    30-year-old female brought in by ambulance due to report of fall out of bed. Unwitnessed.   The patient was found on t at [facility] MAIN OR   • CABG  2001   • CAROTID ENDARTERECTOMY     • CATARACT  1993   • CATARACT  1998   • CHOLECYSTECTOMY     • EXTREMITY LOWER HARDWARE REMOVAL Right 4/18/2017    Performed by Elodia Jaramillo MD at [facility] MAIN OR   • FRACTURE SURGERY  12/2016    r discomfort. No obvious deformities noted.     ED Course     Labs Reviewed   COMP METABOLIC PANEL (14) - Abnormal; Notable for the following components:       Result Value    Glucose 151 (*)     BUN 71 (*)     Creatinine 1.74 (*)     BUN/CREA Ratio 40.8 (*) atrophy with small vessel changes.  3. Stable calcified meningioma within the left middle cranial fossa   Dictated by: Constantin Potter MD on 2/15/2020 at 12:28     Approved by: Constantin Potter MD on 2/15/2020 at 12:30          Ct Spine Cervical (cpt=72125 as cervical spine injury and CT scan showed no acute intracranial findings cerebral atrophy with small vessel changes and stable calcified meningioma within the left middle cranial fossa.   In the CT of the cervical spine showed no acute fractures multileve to pneumonia ([facility] J18.9, A41.9 2/15/2020 Unknown

## 2020-02-16 PROCEDURE — 99232 SBSQ HOSP IP/OBS MODERATE 35: CPT | Performed by: INTERNAL MEDICINE

## 2020-02-16 NOTE — RESPIRATORY THERAPY NOTE
Received pt on 10L HFNC, now decreased to 8L, tolerating well. Breath sounds diminished and coarse. Congested cough. 1.25mg albuterol Q6. Will continue to monitor.

## 2020-02-16 NOTE — PROGRESS NOTES
[facility]  Progress Note    John Escudero Patient Status:  Inpatient    1923 MRN [id number]   [facility] 8NE-A Attending Lupe Reaves MD   Hosp Day # 1 PCP Monique Chauhan MD       Assessment and Plan:  Patient Active Prob elderly 51-year-old lady.         Subjective:  Resting comfortably in no apparent distress    Objective:  BP (!) 89/52 (BP Location: Right arm)   Pulse 88   Temp 98.2 °F (36.8 °C) (Axillary)   Resp 22   Wt 147 lb 11.3 oz (67 kg)   SpO2 97%   BMI 23.84 kg/m² mg/mL premix/add-vantage, 5 mg/hr, Intravenous, Continuous  ARIPiprazole (ABILIFY) tab 2 mg, 2 mg, Oral, Nightly  glycerin-Hypromellose- (ARTIFICAL TEARS) 0.2-0.2-1 % ophthalmic solution 1 drop, 1 drop, Both Eyes, Daily  Divalproex Sodium (DEPAKOTE

## 2020-02-16 NOTE — PROGRESS NOTES
[facility]  Progress Note    Feliciano Weaver Patient Status:  Inpatient    1923 MRN [id number]   [facility] 8NE-A Attending Kumar Mabry MD   Hosp Day # 1 PCP Vladimir Howard MD     Subjective:  Feliciano Weaver is a(n) 80 panel negative    Radiology:  Xr Chest Ap Portable  (cpt=71045)    Result Date: 2/16/2020  CONCLUSION:  1. Worsening opacities within bilateral lower to mid lung zones, left greater than right, representing worsening pneumonia.  2. Small areas of lucency wi bronchopulmonary toilet as possible  Continue current level of antibiotic therapy    Discussed at length with patient's daughter at bedside plan for palliative care assessment and IV morphine as needed for respiratory distress.     Ulises Martin MD

## 2020-02-16 NOTE — PLAN OF CARE
Patient is alert to self. AF on tele, rate controlled. Oxygenation is 99% on 8 L NC. L sounds diminished with rhonchi and wheezes. VSSMikaela Murphy Patient is resting comfortably in bed at this time.     POC: turn q 2, oral hygiene, IV antibiotics, morphine prn, palli Include patient/family/discharge partner in discharge planning  - Arrange for needed discharge resources and transportation as appropriate  - Identify discharge learning needs (meds, wound care, etc)  - Arrange for interpreters to assist at discharge as ne

## 2020-02-16 NOTE — CONSULTS
[facility]    Report of Consultation    [place] Patient Status:  Inpatient    1923 MRN [id number]   [facility] 8NE-A Attending Taylor Mathis MD   Hosp Day # 0 PCP Lennox Ahr, MD     Date of Admission:  2/15/ Procedure Laterality Date   • ANKLE OPEN REDUCTION INTERNAL FIXATION Right 12/20/2016    Performed by Blaze Vargas MD at [facility] MAIN OR   • CABG  2001   • CAROTID ENDARTERECTOMY     • CATARACT  1993   • CATARACT  1998   • CHOLECYSTECTOMY     • EXTREMITY mg, Oral, Daily  •  Piperacillin Sod-Tazobactam So (ZOSYN) 3.375 g in dextrose 5 % 100 mL ADD-vantage, 3.375 g, Intravenous, Q8H    Review of Systems:  All systems were reviewed and are negative except as described above in HPI.     Physical Exam:  Blood pr ground emesis     Anemia     Azotemia     Encephalopathy, INFECTION     Hyperglycemia     Sepsis due to pneumonia [facility])     Atrial fibrillation with rapid ventricular response (HCC)      Rapid atrial fibrillation. Duration of atrial fibrillation unclear.

## 2020-02-16 NOTE — SLP NOTE
Order received for BSSE. Pt's daughter present at the bedside. D/w pt's RN prior to entering room. Pt was last seen by our service in May 2015. Pt is on 8L HFO2. Chest xray worse today per RN and pt's daughter. Pt inconsistently verbalizing.  Family to disc

## 2020-02-16 NOTE — H&P
[address] Patient Status:  Inpatient    1923 MRN [id number]   [facility] 8NE-A Attending Alannah Waters MD   Hosp Day # 1 PCP Oral Mcdonald MD     History of Present Illness:  Nila Burr unspecified type diabetes mellitus without mention of complication, not stated as uncontrolled    • Unspecified essential hypertension    • Visual impairment      Past Surgical History:   Procedure Laterality Date   • ANKLE OPEN REDUCTION INTERNAL FIXATION Cream, Apply 1 Application topically 2 (two) times daily. , Disp: , Rfl: , 2/15/2020 at Unknown time  Calcium Carbonate Antacid 500 MG Oral Chew Tab, Chew 1 tablet by mouth every 6 (six) hours as needed for Heartburn., Disp: , Rfl: , Past Week at Unknown ti LYMPHADENOPATHY  THORAX: NO CVAT, INGUINAL OR AXILLARY LYMPHADENOPATHY  LUNGS: Crackles throughout including anteriorly  HEART: Irregularly irregular; tachycardic, S1/S2.   ABDOMEN: SOFT, NT; NO HSM, MASSES, GUARDING/REBOUND  EXTREMITIES: NO C/C/E  NEUROLOG No acute intracranial findings  2. Cerebral atrophy with small vessel changes.   3. Stable calcified meningioma within the left middle cranial fossa        Dictated by: Praveen Martino MD on 2/15/2020 at 12:28       Approved by: Praveen Martino MD on 2/15 PORTABLE  (CPT=71010), 5/23/2015, 15:51. INDICATIONS:  fall     PATIENT STATED HISTORY: (As transcribed by Technologist)  Patient offered no additional history at this time.           FINDINGS:  There is worsening reticular nodular opacities diffusely m 100    Elevated renal indices–suspect patient is somewhat dry, but cardiology has asked to limit fluid.     Elevated liver enzymes continue to monitor    Dementia–CPM    DVT prophylaxis–we will add subcu heparin    Jannette Blanton  2/16/2020  11:14 AM

## 2020-02-16 NOTE — PROGRESS NOTES
Pt received from ER via cart. Pt lethargic, very [place], awakens to name but quickly falls asleep after conversation. Oriented to self. Daughter at bedside. A fib rhythm with HR from 100-140s. Cardizem drip increased to 5mg per order from Dr. Tanja Styles.  Celena

## 2020-02-16 NOTE — DIETARY NOTE
[address] [facility]     Admitting diagnosis:  Atrial fibrillation with rapid ventricular response ([address] [I48.91]  Sepsis due to pneumonia ([address] [J18.9, A41.9]    Ht:  5'6\"  Wt: 67 kg (147 lb 11.3 oz).  This is 113 % o

## 2020-02-16 NOTE — PLAN OF CARE
Pt denies c/o pain, malaise, or cardiac symptoms. A&Ox1, oriented to person, relieved by rest. Lungs with crackles bilaterally, equal expansion. Pt in atrial fibrillation, asymptomatic.  Abdomen soft and non-tender with hypoactive bowel sounds in all four q Identify barriers to discharge w/pt and caregiver  - Include patient/family/discharge partner in discharge planning  - Arrange for needed discharge resources and transportation as appropriate  - Identify discharge learning needs (meds, wound care, etc)  -

## 2020-02-17 PROBLEM — J18.9 PNEUMONIA: Status: ACTIVE | Noted: 2020-01-01

## 2020-02-17 PROCEDURE — 99232 SBSQ HOSP IP/OBS MODERATE 35: CPT | Performed by: INTERNAL MEDICINE

## 2020-02-17 NOTE — RESPIRATORY THERAPY NOTE
Received pt on 8L HFNC, tolerating well. 1.25mg albuterol Q6. Suctioned moderate amount of white/yellow thick secretions from back of throat. Will continue to monitor closely.

## 2020-02-17 NOTE — PHYSICAL THERAPY NOTE
PT order received via admission protocol, Chart reviewed. Pt presents from LTC, uses lift to get to W/C at [facility] and has assist for all ADLs at baseline. Will dc as this time as pt is at functional baseline.   Rec nsg staff continue use of lift to mobilize

## 2020-02-17 NOTE — PLAN OF CARE
Patient is alert to self, moans when moved. 95% on 6-10 L HFNC. L sounds wheezy and rhonchi, unable to effectively clear sputum. AF on tele, rate controlled. Patient denies pain. Patient is resting comfortably in the bed at this time. Daughter at bedside. partner in discharge planning  - Arrange for needed discharge resources and transportation as appropriate  - Identify discharge learning needs (meds, wound care, etc)  - Arrange for interpreters to assist at discharge as needed  - Consider post-discharge p

## 2020-02-17 NOTE — SLP NOTE
Pt remains inappropriate for bedside swallow evaluation at this time given respiratory status. Family exploring hospice services at this time. Will continue to follow and await family decision.     [place], [address] [facility]  Pager

## 2020-02-17 NOTE — PALLIATIVE CARE NOTE
Received request from Dr. Hoda Mckeon for [address] discussions. Spoke with dtr Daron Batres [phone] who advised that at this time she is interested in exploring hospice services.     Daron Batres wishes to explore hospice agency options, and plans to touch base with SW

## 2020-02-17 NOTE — PROGRESS NOTES
02/17/20 1353   Clinical Encounter Type   Patient's Supportive Strategies/Resources Father Katy Marily provided prayer, Scripture, support and Sacrament of the Sick.

## 2020-02-17 NOTE — HOSPICE RN NOTE
Support provided to family; daughter and granddaughter. Pt continues w labored breathing w MS 2mg/h since about 39' ago. POC to increase for dyspnea reviewed with family and with RN Chloe Fuentes.

## 2020-02-17 NOTE — PROGRESS NOTES
[facility] Note:  Renal Adjustment for piperacillin/tazobactam (Luellen Comp)    Jaime Mccarthy is a 80year old female who has been prescribed piperacillin/tazobactam (ZOSYN) 3375 mg every 8 hrs.   Serum creatine is 1.87 mg/dL so the dose has been adjusted

## 2020-02-17 NOTE — OCCUPATIONAL THERAPY NOTE
Received order for OT evaluation through ADL screening. Per chart, patient has assistance with all ADL at baseline. Uses lift for transfers. OT will sign off.

## 2020-02-17 NOTE — CM/SW NOTE
chucky met with dtr at bedside regarding hospice consult. Pt resides LTC at [facility]. dtr is interested in hospice services and agrees that pt may need to remain in the hospital for inpt hospice services. She is agreeable to residential hospice.      Referral pa

## 2020-02-17 NOTE — PROGRESS NOTES
[facility]  Progress Note    Sonia Brenner Patient Status:  Inpatient    1923 MRN [id number]   [facility] 8NE-A Attending Long Dunlap MD   Hosp Day # 2 PCP Guillermina Flanagan MD       Sitting complaint shortness of breath  Hi 02/15/20  1049 02/16/20  0538 02/17/20  0529    148* 148*   K 4.7 4.4 4.5    115* 115*   CO2 27.0 26.0 24.0   BUN 71* 74* 90*   CREATSERUM 1.74* 1.54* 1.87*   CA 8.6 8.2* 8.1*   * 118* 133*       Recent Labs   Lab 02/15/20  1049 02/16/20 Dictated by: Colby Aase, MD on 2/15/2020 at 12:28     Approved by: Colby Aase, MD on 2/15/2020 at 12:30          Ct Spine Cervical (cpt=72125)    Result Date: 2/15/2020  PROCEDURE:  CT SPINE CERVICAL (CPT=72125)  COMPARISON:  [facility], CT SPIN (CPT=71010), 5/23/2015, 15:51. INDICATIONS:  dyspnea  PATIENT STATED HISTORY: (As transcribed by Technologist)  Patient offered no additional history at this time. FINDINGS:  Stable cardiac size. Diffuse interstitial opacities.   Worsening of opacity w 11:44              Meds:     • piperacillin-tazobactam  3.375 g Intravenous Q12H   • Heparin Sodium (Porcine)  5,000 Units Subcutaneous 2 times per day   • ARIPiprazole  2 mg Oral Nightly   • glycerin-Hypromellose-  1 drop Both Eyes Daily   • Divalp prophylaxis–we will add subcu heparin  See tests ordered,  Available and radiology reviewed  All consultant notes reviewed  Discussed with nursing on floor  dvt prophylaxis reviewed  PT and/or OT  Rita Escobedo MD

## 2020-02-17 NOTE — PLAN OF CARE
Assumed care of pt around 1930. Pt AOx1. 8L HFNC, frequently desaturating after movement and coughing, recovers on 9L HFCL, eventually weaned to 6.5L HFNC. Resting comfortably. Wakes up at times and coughs.  Sounds congested and wet but unable to cough up s Discharge to home or other facility with appropriate resources  Description  INTERVENTIONS:  - Identify barriers to discharge w/pt and caregiver  - Include patient/family/discharge partner in discharge planning  - Arrange for needed discharge resources and

## 2020-02-17 NOTE — HOSPICE RN NOTE
Met with daughter May Sung to review hospice philosophy and benefit.   Daughter states that she recognizes that her mother has had reduced eating and more sleeping over the past 6 months and she feels that her Mother would not want extensive interventions a

## 2020-02-18 NOTE — PROGRESS NOTES
[facility]  lukas Myles Patient Status:  Inpatient    1923 MRN [id number]   [facility] 8NE-A Attending Jacqueline Estrella MD   Hosp Day # 1 PCP Richard Lemus MD       Presenting  Complaint- shortness of breath  Hi CREATSERUM 1.74* 1.54* 1.87*   CA 8.6 8.2* 8.1*   * 118* 133*       Recent Labs   Lab 02/15/20  1049 02/16/20  0538   ALT 61* 97*   AST 76* 125*   ALB 2.6* 2.0*       No results for input(s): PGLU in the last 168 hours.     No results for input(s): INDICATIONS:  fall  TECHNIQUE:  Noncontrast CT scanning of the cervical spine is performed from the skull base through C7. Multiplanar reconstructions are generated. Dose reduction techniques were used.  Dose information is transmitted to the ACR (Americ opacity noted. Slight worsening right basilar opacity with lucency within the right lung base. Median sternotomy approximated by wire sutures. Surgical clips within the right neck. CONCLUSION:  1.  Worsening opacities within bilateral lower to mid l encounter     Closed right ankle fracture     right ankle retained hardare sx  golbal exp 7/17/17     Coffee ground emesis     Anemia     Azotemia     Encephalopathy, INFECTION     Hyperglycemia     Sepsis due to pneumonia [facility])     Atrial fibrillation wit

## 2020-02-18 NOTE — PLAN OF CARE
Body prepared. Transport paged to bring patient to [facility]. Paperwork completed. Family not at bedside.

## 2020-02-18 NOTE — PROGRESS NOTES
This RN along with Zara Miles confirmed time of death at 18:07 on 02/17/2020. Family at bedside. Dr. Jessica Negro, Residential Hospice, spiritual care, medical records (LVM), Nursing Coordinator, Public Safety, and [facility] of [address] [facility],  notified.  Report of information an

## 2020-02-18 NOTE — PROGRESS NOTES
02/17/20 1914   Clinical Encounter Type   Visited With Family   Routine Visit Follow-up   Continue Visiting No   Crisis Visit Death  ( provided end of life ministry t the family.)   Patient's Supportive Strategies/Resources Chaplan provided emot

## 2020-02-18 NOTE — H&P
History and physical      Presenting  Complaint- shortness of breath  History present illness–elderly female admitted over the weekend by on-call physician Dr. Sophia Espinoza for sepsis, pneumonia, respiratory failure, patient had multi-speciality consulta 61* 97*   AST 76* 125*   ALB 2.6* 2.0*         No results for input(s): PGLU in the last 168 hours.     No results for input(s): URINE, CULTI, BLDSMR in the last 168 hours.     Recent Results   No results found for this visit on 02/17/20.        Ct Brain O skull base through C7. Multiplanar reconstructions are generated. Dose reduction techniques were used.  Dose information is transmitted to the [facility] [employer] of Radiology) NRDR ([address]) which includes the Dose Index Regist Median sternotomy approximated by wire sutures. Surgical clips within the right neck.       CONCLUSION:  1. Worsening opacities within bilateral lower to mid lung zones, left greater than right, representing worsening pneumonia.  2. Small areas of lucency 7/17/17     Coffee ground emesis     Anemia     Azotemia     Encephalopathy, INFECTION     Hyperglycemia     Sepsis due to pneumonia [facility])     Atrial fibrillation with rapid ventricular response (HCC)     Pneumonia     Active Problems:    Pneumonia

## 2020-02-18 NOTE — DISCHARGE SUMMARY
[facility]  Discharge Summary    Martha Valencia Patient Status:  Inpatient    1923 MRN [id number]   [facility] 8NE-A Attending No att. providers found   Hosp Day # 2 PCP Lynda Jimenez MD     Date of Admission: 2/15/2020    Da but most consistent with small vessel ischemic changes. There is no evidence of hemorrhage, , midline shift, or extra-axial fluid collection.   Calcified lesion within the left middle cranial fossa measuring 1.6 x 1.2 x 0.8 cm likely represents a calcified disc disease. 3. Reticular nodular opacities within bilateral upper lobes, left greater than right, which may represent infectious etiology.     Dictated by: Yelena Coon MD on 2/15/2020 at 12:35     Approved by: Yelena Coon MD on 2/15/2020 at 12: opacities diffusely may represent worsening infectious or inflammatory process within the lung versus underlying fibrosis. There is a hiatal hernia on the left. Cardiac enlargement is stable. Median sternotomy wires are stable.   There is no pleural effu icterus or edema                                  Neurologic :  General weakness, lethargic                                                        Data Review:       Labs:           Recent Labs   Lab 02/15/20  1048 02/16/20  0538   WBC 10.0 9.2   HGB 10.7* disease. No evidence of depressed skull fracture.       CONCLUSION: 1. No acute intracranial findings 2. Cerebral atrophy with small vessel changes.  3. Stable calcified meningioma within the left middle cranial fossa   Dictated by: Heidi Deleon MD on 2 2/16/2020  PROCEDURE:  XR CHEST AP PORTABLE  (CPT=71045)  TECHNIQUE:  AP chest radiograph was obtained. COMPARISON:  [facility] , XR, XR CHEST AP PORTABLE  (CPT=71045), 2/15/2020, 11:13. [facility] , XR, XR CHEST AP PORTABLE  (CPT=71010), 5/23/2015, 15:51.   JUNE opacities within the left greater than right lung could represent a chronic infectious or other indolent inflammatory process.     Dictated by: Matthew Brooks MD on 2/15/2020 at 11:43     Approved by: Matthew Brooks MD on 2/15/2020 at 11:44                 [facility]    Discharge Condition: Stable    Discharge Medications: Discharge Medication List as of 2/17/2020 12:14 PM    STOP taking these medications    ARIPiprazole 2 MG Oral Tab    Divalproex Sodium 125 MG Oral Capsule Delayed Release Sprinkle    Levothyr

## 2020-02-18 NOTE — DISCHARGE SUMMARY
[facility]  Discharge Summary    Hawk Horn Patient Status:  Inpatient    1923 MRN [id number]   [facility] 8NE-A Attending No att. providers found   Hosp Day # 1 PCP Jaqueline Coleman MD     Date of Admission: 2020    Da calcified meningioma. The visualized paranasal sinuses show no significant sinus disease. No evidence of depressed skull fracture. CONCLUSION: 1. No acute intracranial findings 2. Cerebral atrophy with small vessel changes.  3. Stable calcified mening 2/15/2020 at 12:38          Xr Chest Ap Portable  (cpt=71045)    Result Date: 2/16/2020  PROCEDURE:  XR CHEST AP PORTABLE  (CPT=71045)  TECHNIQUE:  AP chest radiograph was obtained.   COMPARISON:  [facility] , XR, XR CHEST AP PORTABLE  (CPT=71045), 2/15/2020, 1 no pleural effusion. CONCLUSION:  Worsening reticular nodular opacities within the left greater than right lung could represent a chronic infectious or other indolent inflammatory process.     Dictated by: Kam Godoy MD on 2/15/2020 at 11:43     Sharri 10.0 9.2   HGB 10.7* 9.0*   MCV 94.4 100.0   .0 207.0   BAND  --  20               Recent Labs   Lab 02/15/20  1049 02/16/20  0538 02/17/20  0529    148* 148*   K 4.7 4.4 4.5    115* 115*   CO2 27.0 26.0 24.0   BUN 71* 74* 90*   JESSICA Jono Garcia MD on 2/15/2020 at 12:28     Approved by: Jono Garcia MD on 2/15/2020 at 12:30           Ct Spine Cervical (cpt=72125)     Result Date: 2/15/2020  PROCEDURE:  CT SPINE CERVICAL (CPT=72125)  COMPARISON:  SIMON WYMAN, CT SPINE CERVICAL 5/23/2015, 15:51. INDICATIONS:  dyspnea  PATIENT STATED HISTORY: (As transcribed by Technologist)  Patient offered no additional history at this time. FINDINGS:  Stable cardiac size. Diffuse interstitial opacities.   Worsening of opacity within the lef          Meds:                      Assessment/Plan:   Patient Active Problem List:     Urinary tract infection     TIA (transient ischemic attack)     At risk for falling     UTI (lower urinary tract infection)     Hypothyroidism     Slurred speech Krystian Simms  2/18/2020  9:40 AM

## 2022-04-05 NOTE — PROGRESS NOTES
[facility] Progress Note    Patient seen and examined.  Chart reviewed.  Discussed with family at bedside. Not responsive. Labored breathing. In no apparent distress.     /48 (BP Location: Right arm)   Pulse 99   Temp 99 °F (37.2 °C) (Axillary 3.375 g, Intravenous, Q8H  [COMPLETED] digoxin (LANOXIN) 250 MCG/ML injection 125 mcg, 125 mcg, Intravenous, Once  [COMPLETED] Amiodarone HCl (CORDARONE) 150 mg in dextrose 5 % 100 mL bolus, 150 mg, Intravenous, Once  dilTIAZem HCl (CARDIZEM) injection 5 m Prednisone Counseling:  I discussed with the patient the risks of prolonged use of prednisone including but not limited to weight gain, insomnia, osteoporosis, mood changes, diabetes, susceptibility to infection, glaucoma and high blood pressure.  In cases where prednisone use is prolonged, patients should be monitored with blood pressure checks, serum glucose levels and an eye exam.  Additionally, the patient may need to be placed on GI prophylaxis, PCP prophylaxis, and calcium and vitamin D supplementation and/or a bisphosphonate.  The patient verbalized understanding of the proper use and the possible adverse effects of prednisone.  All of the patient's questions and concerns were addressed.

## (undated) DEVICE — 3M™ COBAN™ NL STERILE NON-LATEX SELF-ADHERENT WRAP, 2084S, 4 IN X 5 YD (10 CM X 4,5 M), 18 ROLLS/CASE: Brand: 3M™ COBAN™

## (undated) DEVICE — STERILE POLYISOPRENE POWDER-FREE SURGICAL GLOVES: Brand: PROTEXIS

## (undated) DEVICE — LOWER EXTREMITY CDS-LF: Brand: MEDLINE INDUSTRIES, INC.

## (undated) DEVICE — SOL  .9 1000ML BTL

## (undated) DEVICE — GLOVE SURG SENSICARE SZ 6-1/2

## (undated) DEVICE — KENDALL SCD EXPRESS SLEEVES, KNEE LENGTH, MEDIUM: Brand: KENDALL SCD

## (undated) DEVICE — SUTURE ETHILON 3-0 PS-1

## (undated) DEVICE — UNDYED BRAIDED (POLYGLACTIN 910), SYNTHETIC ABSORBABLE SUTURE: Brand: COATED VICRYL

## (undated) DEVICE — OCCLUSIVE GAUZE STRIP OVERWRAP,3% BISMUTH TRIBROMOPHENATE IN PETROLATUM BLEND: Brand: XEROFORM

## (undated) DEVICE — GOWN SURG AERO CHROME XXL

## (undated) DEVICE — SURGICAL INSTRUMENT OR ACCESSORY FOR ORTHOPEDIC BONE FIXATION: Brand: GUIDE WIRE KIT, 3.0/3.5/4.0/4.5 CANNULATED SCREWS

## (undated) NOTE — IP AVS SNAPSHOT
[facility] [place] [address] [place], [address] ~ [phone]                Discharge Summary   4/18/2017    Hortencia Segura           Admission Information        Provider Department    4/18/2017 Anne Fitzgerald MD  Pre-Op / Take by mouth 2 (two) times daily. [    ]    [    ]    [    ]    [    ]       cyanocobalamin 1000 MCG/ML Soln   Commonly known as:  VITAMIN B12        Inject 1 mL into the muscle every 30 (thirty) days.     Ellie Raman     [    ]    [    ]    [    ] Bear weight as tolerated  Pain medications provided to patient  Keep limb up and elevated as much as possible  Leave dressing in place, Dr. Millie Hernandez will change at office visit.   Follow up with Dr. Millie Hernandez in 1 week      Also see Post-op instruction sheet Specialties:  SURGERY, ORTHOPEDIC, Surgery, Foot & Ankle    Contact information:    [facility] [address] [place] [address]  [phone]        Future Appointments        Provider Department    4/24/2017 12:15 PM [facility] OR

## (undated) NOTE — IP AVS SNAPSHOT
Patient Demographics     Address  [address] [address] Phone  [phone] [facility])  [phone] (Mobile) *Preferred*      Emergency Contact(s)     Name Relation Home Work Andersen Daughter [phone] Stop taking on:  2/1/2019   Olga Bustamante MD         sucralfate 1 g Tabs  Commonly known as:  CARAFATE      Take 1 tablet (1 g total) by mouth 3 (three) times daily with meals for 28 days.   Stop taking on:  1/30/2019   Olga Bustamante MD               Where BUN 19 8 - 20 mg/dL — [facility]   Creatinine 0.91 0.55 - 1.02 mg/dL [facility]   BUN/CREA Ratio 20.9 10.0 - 20.0 [facility]   Calcium, Total 7.4 8.3 - 10.3 mg/dL  [facility]   Calculated Osmolality 301 275 - 295 mOsm/kg [facility]   GFR, Non-African Order Status:  Completed Lab Status:  Preliminary result Updated:  01/02/19 0730    Specimen:  Urine, straight cath      Urine Culture 10,000 - 50,000 CFU/ML Escherichia coli         H&P - H&P Note      H&P signed by Yohan Pavon MD at 1/1/2019 10:20 AM Assessment and Plan:     Coffee ground emesis[ZC. 1]    ASHD    CKD    Dementia with agitation       await GI consult, continue iv protonix , observe and monitor,  In wiev of GI bleed will not anticoagulate patient , her d dimer is elevated , but risk outwe discharge, patient will require TBD. Regis Cardoso MD  1/1/2019[ZC.1]    Electronically signed by Desiree Foreman MD on 1/1/2019 10:20 AM   Attribution Key    ZC. 1 - Desiree Foreman MD on 1/1/2019  9:59 AM  ZC.2 - Desiree Foreman MD on 1/1/2019 10:06 AM  ZC. • Osteoarthrosis, unspecified whether generalized or localized, unspecified site    • Other and unspecified hyperlipidemia    • OTHER DISEASES     CATARACTS   • OTHER DISEASES     REFLUX/HEARTBURN   • OTHER DISEASES     FREQUENT UTI's   • OTHER DISEASES •  Pantoprazole Sodium (PROTONIX) 40 mg in Sodium Chloride 0.9 % 10 mL IV push, 40 mg, Intravenous, Q12H  •  ARIPiprazole (ABILIFY) tab 2 mg, 2 mg, Oral, Nightly  •  Ampicillin Sodium (OMNIPEN) 1 g in sodium chloride 0.9 % 100 mL IVPB-minibag/add-vantage, -likely due to All's erosions vs. PUD in setting of large hiatal hernia.   -hgb stable, no further emesis  -recommend increasing omeprazole frequency to 20 mg twice daily  -start carafate 1 g tid w/ meals x 4 weeks  -reduce aspirin frequency to every o :  Sabrina Grier OT (Occupational Therapist)       OT order received, Chart reviewed, paper chart from [facility]. Pat's reviewed, pt uses lift to get to W/C at [facility] and has assist for all ADLs at baseline, pt not approp for skilled IP OT as pt has thu